# Patient Record
Sex: FEMALE | Race: BLACK OR AFRICAN AMERICAN
[De-identification: names, ages, dates, MRNs, and addresses within clinical notes are randomized per-mention and may not be internally consistent; named-entity substitution may affect disease eponyms.]

---

## 2019-09-21 ENCOUNTER — HOSPITAL ENCOUNTER (EMERGENCY)
Dept: HOSPITAL 72 - EMR | Age: 34
Discharge: HOME | End: 2019-09-21
Payer: COMMERCIAL

## 2019-09-21 VITALS — DIASTOLIC BLOOD PRESSURE: 70 MMHG | SYSTOLIC BLOOD PRESSURE: 127 MMHG

## 2019-09-21 VITALS — WEIGHT: 190 LBS | HEIGHT: 68 IN | BODY MASS INDEX: 28.79 KG/M2

## 2019-09-21 VITALS — SYSTOLIC BLOOD PRESSURE: 130 MMHG | DIASTOLIC BLOOD PRESSURE: 90 MMHG

## 2019-09-21 VITALS — SYSTOLIC BLOOD PRESSURE: 133 MMHG | DIASTOLIC BLOOD PRESSURE: 89 MMHG

## 2019-09-21 DIAGNOSIS — Z79.899: ICD-10-CM

## 2019-09-21 DIAGNOSIS — R07.9: Primary | ICD-10-CM

## 2019-09-21 DIAGNOSIS — F43.9: ICD-10-CM

## 2019-09-21 DIAGNOSIS — Z86.73: ICD-10-CM

## 2019-09-21 LAB
ADD MANUAL DIFF: NO
ALBUMIN SERPL-MCNC: 3.6 G/DL (ref 3.4–5)
ALBUMIN/GLOB SERPL: 0.9 {RATIO} (ref 1–2.7)
ALP SERPL-CCNC: 48 U/L (ref 46–116)
ALT SERPL-CCNC: 25 U/L (ref 12–78)
ANION GAP SERPL CALC-SCNC: 8 MMOL/L (ref 5–15)
APPEARANCE UR: CLEAR
APTT BLD: 30 SEC (ref 23–33)
APTT PPP: (no result) S
AST SERPL-CCNC: 17 U/L (ref 15–37)
BASOPHILS NFR BLD AUTO: 0.9 % (ref 0–2)
BILIRUB SERPL-MCNC: 0.3 MG/DL (ref 0.2–1)
BUN SERPL-MCNC: 9 MG/DL (ref 7–18)
CALCIUM SERPL-MCNC: 9 MG/DL (ref 8.5–10.1)
CHLORIDE SERPL-SCNC: 104 MMOL/L (ref 98–107)
CK SERPL-CCNC: 163 U/L (ref 26–308)
CO2 SERPL-SCNC: 27 MMOL/L (ref 21–32)
CREAT SERPL-MCNC: 0.7 MG/DL (ref 0.55–1.3)
EOSINOPHIL NFR BLD AUTO: 4.2 % (ref 0–3)
ERYTHROCYTE [DISTWIDTH] IN BLOOD BY AUTOMATED COUNT: 12.3 % (ref 11.6–14.8)
GLOBULIN SER-MCNC: 3.9 G/DL
GLUCOSE UR STRIP-MCNC: NEGATIVE MG/DL
HCT VFR BLD CALC: 36.4 % (ref 37–47)
HGB BLD-MCNC: 11.3 G/DL (ref 12–16)
INR PPP: 1 (ref 0.9–1.1)
KETONES UR QL STRIP: NEGATIVE
LEUKOCYTE ESTERASE UR QL STRIP: NEGATIVE
LYMPHOCYTES NFR BLD AUTO: 22.8 % (ref 20–45)
MCV RBC AUTO: 74 FL (ref 80–99)
MONOCYTES NFR BLD AUTO: 12.3 % (ref 1–10)
NEUTROPHILS NFR BLD AUTO: 59.8 % (ref 45–75)
NITRITE UR QL STRIP: NEGATIVE
PH UR STRIP: 6 [PH] (ref 4.5–8)
PLATELET # BLD: 222 K/UL (ref 150–450)
POTASSIUM SERPL-SCNC: 3.5 MMOL/L (ref 3.5–5.1)
PROT UR QL STRIP: NEGATIVE
RBC # BLD AUTO: 4.9 M/UL (ref 4.2–5.4)
SODIUM SERPL-SCNC: 139 MMOL/L (ref 136–145)
SP GR UR STRIP: 1.01 (ref 1–1.03)
UROBILINOGEN UR-MCNC: NORMAL MG/DL (ref 0–1)
WBC # BLD AUTO: 3.8 K/UL (ref 4.8–10.8)

## 2019-09-21 PROCEDURE — 84484 ASSAY OF TROPONIN QUANT: CPT

## 2019-09-21 PROCEDURE — 85610 PROTHROMBIN TIME: CPT

## 2019-09-21 PROCEDURE — 85025 COMPLETE CBC W/AUTO DIFF WBC: CPT

## 2019-09-21 PROCEDURE — 81003 URINALYSIS AUTO W/O SCOPE: CPT

## 2019-09-21 PROCEDURE — 82550 ASSAY OF CK (CPK): CPT

## 2019-09-21 PROCEDURE — 96374 THER/PROPH/DIAG INJ IV PUSH: CPT

## 2019-09-21 PROCEDURE — 83880 ASSAY OF NATRIURETIC PEPTIDE: CPT

## 2019-09-21 PROCEDURE — 71045 X-RAY EXAM CHEST 1 VIEW: CPT

## 2019-09-21 PROCEDURE — 93005 ELECTROCARDIOGRAM TRACING: CPT

## 2019-09-21 PROCEDURE — 85730 THROMBOPLASTIN TIME PARTIAL: CPT

## 2019-09-21 PROCEDURE — 81025 URINE PREGNANCY TEST: CPT

## 2019-09-21 PROCEDURE — 99284 EMERGENCY DEPT VISIT MOD MDM: CPT

## 2019-09-21 PROCEDURE — 36415 COLL VENOUS BLD VENIPUNCTURE: CPT

## 2019-09-21 PROCEDURE — 80053 COMPREHEN METABOLIC PANEL: CPT

## 2019-09-21 NOTE — DIAGNOSTIC IMAGING REPORT
EXAM:

  XR Chest, 1 View

 

CLINICAL HISTORY:

  CP

 

TECHNIQUE:

  Frontal view of the chest.

 

COMPARISON:

  No relevant prior studies available.

 

FINDINGS:

  Lungs:  No consolidation.

  Pleural space:  Unremarkable.  No pneumothorax.

  Heart: Mild cardiomegaly.

  Mediastinum:  Unremarkable.

  Bones joints:  No acute fracture.

 

IMPRESSION:     

  No acute cardiopulmonary disease.

## 2019-09-21 NOTE — NUR
ED Nurse Note:

per Dr. Oneal, pt is ok to go restroom for urine sample.  RN reconnected pt to 
cardiac monitor after she came back from restroom.  per pt, "pain gets better.  
2/10"

## 2019-09-21 NOTE — EMERGENCY ROOM REPORT
History of Present Illness


General


Chief Complaint:  Chest Pain


Source:  Patient





Present Illness


HPI


Patient presents with substernal chest pain that began when she got up this 

morning.  She felt pressure in her chest.  It seemed to worsen when she tried 

to take a shower.  She went to lay back down again and it got slightly better.  

She has a lot of stress in her job at this time.  She feels this might be 

related.  She denies any fevers, chills, sore throat, productive cough.  She 

was somewhat nauseated yesterday.  A week ago her doctor increased 1 of her 

medications bupropion to 300 mg a day.  She also takes hydroxyzine and iron.  

At this time she rates the pain 4/10 and fairly constant not positional or 

exertional.  No calf pain or edema.  No suicidal or homicidal ideation at this 

time.





She does not believe she is pregnant at this time.  Her menstruation is about 

to begin again.  The last time she had alcohol was 3 weeks ago.





Risk factors for cardiac disease: None





According to the medical history she alleges to have had a CVA in the past.  

There is no residual.  In addition she reports a history of anemia.





No palpitations, vomiting, diarrhea, dysuria, abdominal pain, shortness of 

breath, joint pain, rashes, visual changes, headache.


Allergies:  


Coded Allergies:  


     No Known Allergies (Unverified , 9/21/19)





Patient History


Past Medical History:  see triage record


Social History:  Reports: alcohol use - Rare; Denies: smoking, drug use


Social History Narrative


Hairstylist


Reviewed Nursing Documentation:  PMH: Agreed; PSxH: Agreed





Nursing Documentation-PMH


Hx Cerebrovascular Accident:  Yes





Review of Systems


All Other Systems:  negative except mentioned in HPI





Physical Exam





Vital Signs








  Date Time  Temp Pulse Resp B/P (MAP) Pulse Ox O2 Delivery O2 Flow Rate FiO2


 


9/21/19 08:31 97.9 79 19 130/90 (103) 100   


 


9/21/19 08:35      Room Air  








Sp02 EP Interpretation:  reviewed, normal


General Appearance:  well appearing, no apparent distress, GCS 15


Head:  normocephalic


Eyes:  bilateral eye normal inspection, bilateral eye PERRL, bilateral eye EOMI


ENT:  moist mucus membranes


Neck:  supple


Respiratory:  chest non-tender, lungs clear, normal breath sounds


Cardiovascular #1:  regular rate, rhythm


Cardiovascular #2:  2+ radial (R)


Gastrointestinal:  normal inspection, normal bowel sounds, non tender, no mass, 

non-distended


Genitourinary:  no CVA tenderness


Musculoskeletal:  back normal, gait/station normal, normal range of motion, no 

calf tenderness


Neurologic:  alert, oriented x3, grossly normal


Psychiatric:  mood/affect normal - Slightly flat affect, no suicidal/homicidal 

ideation, other - Reports being stressed at work


Skin:  no rash





Medical Decision Making


Diagnostic Impression:  


 Primary Impression:  


 Chest pain


 Qualified Codes:  R07.9 - Chest pain, unspecified


 Additional Impression:  


 Situational stress


ER Course


Patient presents with substernal chest pressure with increased stress at work.  

Differential includes pulmonary embolus, esophagitis, esophageal spasm, anxiety 

reaction, chest wall pain.  Based on her physical exam and vital signs 

pulmonary embolus is excluded.  Patient will be evaluated with EKG, chest x-ray 

and labs.  The patient will be treated with Mylanta and Pepcid.  The patient is 

placed on a cardiac monitor.





EKG normal sinus rhythm rate 75 with nonspecific ST-T wave changes.  CXR clear.

  Labs unremarkable.





Improved with treatment.





Discussed findings with patient.  I advised her that she needs to discuss the 

dosing of her bupropion with her private physician.  She is considering 

changing jobs to decrease the amount of stress.





Patient stable for outpatient observation and treatment.





Laboratory Tests








Test


  9/21/19


08:51


 


White Blood Count


  3.8 K/UL


(4.8-10.8)  L


 


Red Blood Count


  4.90 M/UL


(4.20-5.40)


 


Hemoglobin


  11.3 G/DL


(12.0-16.0)  L


 


Hematocrit


  36.4 %


(37.0-47.0)  L


 


Mean Corpuscular Volume


  74 FL (80-99)


L


 


Mean Corpuscular Hemoglobin


  23.0 PG


(27.0-31.0)  L


 


Mean Corpuscular Hemoglobin


Concent 30.9 G/DL


(32.0-36.0)  L


 


Red Cell Distribution Width


  12.3 %


(11.6-14.8)


 


Platelet Count


  222 K/UL


(150-450)


 


Mean Platelet Volume


  8.2 FL


(6.5-10.1)


 


Neutrophils (%) (Auto)


  59.8 %


(45.0-75.0)


 


Lymphocytes (%) (Auto)


  22.8 %


(20.0-45.0)


 


Monocytes (%) (Auto)


  12.3 %


(1.0-10.0)  H


 


Eosinophils (%) (Auto)


  4.2 %


(0.0-3.0)  H


 


Basophils (%) (Auto)


  0.9 %


(0.0-2.0)


 


Prothrombin Time


  11.0 SEC


(9.30-11.50)


 


Prothrombin Time INR 1.0 (0.9-1.1)  


 


PTT


  30 SEC (23-33)


 


 


Urine Color Pale yellow  


 


Urine Appearance Clear  


 


Urine pH 6 (4.5-8.0)  


 


Urine Specific Gravity


  1.015


(1.005-1.035)


 


Urine Protein


  Negative


(NEGATIVE)


 


Urine Glucose (UA)


  Negative


(NEGATIVE)


 


Urine Ketones


  Negative


(NEGATIVE)


 


Urine Blood


  Negative


(NEGATIVE)


 


Urine Nitrite


  Negative


(NEGATIVE)


 


Urine Bilirubin


  Negative


(NEGATIVE)


 


Urine Urobilinogen


  Normal MG/DL


(0.0-1.0)


 


Urine Leukocyte Esterase


  Negative


(NEGATIVE)


 


Urine HCG, Qualitative


  Negative


(NEGATIVE)


 


Sodium Level


  139 MMOL/L


(136-145)


 


Potassium Level


  3.5 MMOL/L


(3.5-5.1)


 


Chloride Level


  104 MMOL/L


()


 


Carbon Dioxide Level


  27 MMOL/L


(21-32)


 


Anion Gap


  8 mmol/L


(5-15)


 


Blood Urea Nitrogen


  9 mg/dL (7-18)


 


 


Creatinine


  0.7 MG/DL


(0.55-1.30)


 


Estimate Glomerular


Filtration Rate > 60 mL/min


(>60)


 


Glucose Level


  88 MG/DL


()


 


Calcium Level


  9.0 MG/DL


(8.5-10.1)


 


Total Bilirubin


  0.3 MG/DL


(0.2-1.0)


 


Aspartate Amino Transferase


(AST) 17 U/L (15-37)


 


 


Alanine Aminotransferase (ALT)


  25 U/L (12-78)


 


 


Alkaline Phosphatase


  48 U/L


()


 


Total Creatine Kinase


  163 U/L


()


 


Troponin I


  0.000 ng/mL


(0.000-0.056)


 


Pro-B-Type Natriuretic Peptide


  13 pg/mL


(0-125)


 


Total Protein


  7.5 G/DL


(6.4-8.2)


 


Albumin


  3.6 G/DL


(3.4-5.0)


 


Globulin 3.9 g/dL  


 


Albumin/Globulin Ratio


  0.9 (1.0-2.7)


L








EKG Diagnostic Results


Rate:  normal


Rhythm:  NSR


ST Segments:  no acute changes


ASA given to the pt in ED:  No





Rhythm Strip Diag. Results


EP Interpretation:  yes


Rhythm:  NSR, no PVC's, no ectopy





Chest X-Ray Diagnostic Results


Chest X-Ray Diagnostic Results :  


   Chest X-Ray Ordered:  Yes


   # of Views/Limited/Complete:  1 View


   Indication:  Chest Pain


   EP Interpretation:  Yes


   Interpretation:  no consolidation, no effusion, no pneumothorax


   Impression:  No acute disease


   Electronically Signed by:  Electronically signed by Jose E Oneal MD





Last Vital Signs








  Date Time  Temp Pulse Resp B/P (MAP) Pulse Ox O2 Delivery O2 Flow Rate FiO2


 


9/21/19 10:06 98.0 70 18 127/70 99 Room Air  








Status:  improved


Disposition:  HOME, SELF-CARE


Condition:  Improved


Scripts


Famotidine (PEPCID AC) 20 Mg Tablet


20 MG PO DAILY, #20 TAB


   Prov: Jose E Oneal MD         9/21/19











Jose E Oneal MD Sep 21, 2019 08:44

## 2019-09-21 NOTE — NUR
ED Nurse Note:

pt walked in to ED from home due to non-radiated tightness/pressure epigastric 
cp that started around 0700 this morning.  denies any trauma or injury or heavy 
lift.  skin warm to touch.  no open wound noted.  AAO x4.  respirations even 
and non-labored noted.  no meds taken for pain at home.  on cardiac monitor.  
will wait for the further order.

## 2019-09-22 NOTE — CARDIOLOGY REPORT
--------------- APPROVED REPORT --------------





EKG Measurement

Heart [id number]

NM [id number]

[id number]

[id number]

[id number]





Normal sinus rhythm

Nonspecific T wave abnormality

Abnormal ECG